# Patient Record
Sex: FEMALE
[De-identification: names, ages, dates, MRNs, and addresses within clinical notes are randomized per-mention and may not be internally consistent; named-entity substitution may affect disease eponyms.]

---

## 2020-02-03 PROBLEM — Z00.00 ENCOUNTER FOR PREVENTIVE HEALTH EXAMINATION: Status: ACTIVE | Noted: 2020-02-03

## 2020-02-05 ENCOUNTER — APPOINTMENT (OUTPATIENT)
Dept: SURGERY | Facility: CLINIC | Age: 75
End: 2020-02-05
Payer: MEDICARE

## 2020-02-05 VITALS
DIASTOLIC BLOOD PRESSURE: 84 MMHG | HEIGHT: 63 IN | SYSTOLIC BLOOD PRESSURE: 169 MMHG | HEART RATE: 68 BPM | WEIGHT: 198 LBS | BODY MASS INDEX: 35.08 KG/M2

## 2020-02-05 DIAGNOSIS — Z87.19 PERSONAL HISTORY OF OTHER DISEASES OF THE DIGESTIVE SYSTEM: ICD-10-CM

## 2020-02-05 DIAGNOSIS — Z87.39 PERSONAL HISTORY OF OTHER DISEASES OF THE MUSCULOSKELETAL SYSTEM AND CONNECTIVE TISSUE: ICD-10-CM

## 2020-02-05 DIAGNOSIS — Z86.39 PERSONAL HISTORY OF OTHER ENDOCRINE, NUTRITIONAL AND METABOLIC DISEASE: ICD-10-CM

## 2020-02-05 DIAGNOSIS — Z86.79 PERSONAL HISTORY OF OTHER DISEASES OF THE CIRCULATORY SYSTEM: ICD-10-CM

## 2020-02-05 DIAGNOSIS — Z87.891 PERSONAL HISTORY OF NICOTINE DEPENDENCE: ICD-10-CM

## 2020-02-05 PROCEDURE — 99204 OFFICE O/P NEW MOD 45 MIN: CPT

## 2020-02-05 RX ORDER — ROSUVASTATIN CALCIUM 5 MG/1
5 TABLET, FILM COATED ORAL
Refills: 0 | Status: ACTIVE | COMMUNITY

## 2020-02-05 RX ORDER — ALLOPURINOL 300 MG/1
300 TABLET ORAL
Refills: 0 | Status: ACTIVE | COMMUNITY

## 2020-02-05 RX ORDER — OLMESARTAN MEDOXOMIL 40 MG/1
40 TABLET, FILM COATED ORAL
Refills: 0 | Status: ACTIVE | COMMUNITY

## 2020-02-05 RX ORDER — ASPIRIN 81 MG
81 TABLET, DELAYED RELEASE (ENTERIC COATED) ORAL
Refills: 0 | Status: ACTIVE | COMMUNITY

## 2020-02-05 RX ORDER — VERAPAMIL HYDROCHLORIDE 240 MG/1
240 CAPSULE, DELAYED RELEASE PELLETS ORAL
Refills: 0 | Status: ACTIVE | COMMUNITY

## 2020-02-05 RX ORDER — PANTOPRAZOLE SODIUM 40 MG/1
GRANULE, DELAYED RELEASE ORAL
Refills: 0 | Status: ACTIVE | COMMUNITY

## 2020-02-05 RX ORDER — TORSEMIDE 20 MG/1
20 TABLET ORAL
Refills: 0 | Status: ACTIVE | COMMUNITY

## 2020-02-05 NOTE — ASSESSMENT
[FreeTextEntry1] : Patient with primary hyperparathyroidism with worsening osteopenia and symptoms. I recommended parathyroidectomy with intraoperative PTH monitoring. No need for thyroid resection in view of benign biopsies and stable nodules. I have requested a 4D CT scan to assistant preoperative localization.I have discussed the risks, benefits and alternative treatments which include but are not limited to bleeding, infection, numbness, hoarseness, hypocalcemia, scarring, and need for reoperation. I have answered the patient's questions. They will contact my office to schedule surgery.

## 2020-02-05 NOTE — CONSULT LETTER
[Dear  ___] : Dear  [unfilled], [Consult Letter:] : I had the pleasure of evaluating your patient, [unfilled]. [Please see my note below.] : Please see my note below. [Consult Closing:] : Thank you very much for allowing me to participate in the care of this patient.  If you have any questions, please do not hesitate to contact me. [FreeTextEntry2] : Dr. Lisette Nelson [DrShahid  ___] : Dr. MUNOZ [FreeTextEntry3] : Sincerely yours,\par \par Batsheva Davis MD, FACS\par Assistant Professor of Surgery\par [facility]

## 2020-02-05 NOTE — REASON FOR VISIT
[Initial Consultation] : an initial consultation for [Spouse] : spouse [FreeTextEntry2] : primary hyperparathyroidism

## 2020-02-05 NOTE — PHYSICAL EXAM
[de-identified] : no cervical or supraclavicular adenopathy, trachea midline, thyroid without enlargement or palpable mass [Normal] : no neck adenopathy [de-identified] : Skin:  normal appearance.  no rash, nodules, vesicles, or erythema,\par Musculoskeletal:  full range of motion and no deformities appreciated\par Neurological:  grossly intact\par Psychiatric:  oriented to person, place and time with appropriate affect

## 2020-02-05 NOTE — HISTORY OF PRESENT ILLNESS
[de-identified] : Patient referred by Dr. Nelson for evaluation of primary hyperparathyroidism. Patient reports over a 4 year history of elevated calcium.  Patient reports hypertension, reflux, fatigue and worsening osteopenia. Denies kidney stones, increased thirst, fracture, bone pain, dysphagia, change in voice or radiation exposure. TSH 1.2, calcium 10.6, , vitamin D 41. Bone density August 2019: Spine T. -0.4, hip and -2.4, wrist T. -2.0. Patient reports history of thyroid nodules, biopsied 3 years ago benign. Followup ultrasound May 2019 stable. Results not available.

## 2020-02-13 ENCOUNTER — FORM ENCOUNTER (OUTPATIENT)
Age: 75
End: 2020-02-13

## 2020-02-14 ENCOUNTER — APPOINTMENT (OUTPATIENT)
Dept: CT IMAGING | Facility: IMAGING CENTER | Age: 75
End: 2020-02-14
Payer: MEDICARE

## 2020-02-14 ENCOUNTER — OUTPATIENT (OUTPATIENT)
Dept: OUTPATIENT SERVICES | Facility: HOSPITAL | Age: 75
LOS: 1 days | End: 2020-02-14
Payer: MEDICARE

## 2020-02-14 DIAGNOSIS — E21.0 PRIMARY HYPERPARATHYROIDISM: ICD-10-CM

## 2020-02-14 PROCEDURE — 82565 ASSAY OF CREATININE: CPT

## 2020-02-14 PROCEDURE — 70492 CT SFT TSUE NCK W/O & W/DYE: CPT

## 2020-02-14 PROCEDURE — 70491 CT SOFT TISSUE NECK W/DYE: CPT | Mod: 26

## 2020-03-09 ENCOUNTER — OUTPATIENT (OUTPATIENT)
Dept: OUTPATIENT SERVICES | Facility: HOSPITAL | Age: 75
LOS: 1 days | End: 2020-03-09
Payer: MEDICARE

## 2020-03-09 VITALS
DIASTOLIC BLOOD PRESSURE: 90 MMHG | WEIGHT: 197.98 LBS | SYSTOLIC BLOOD PRESSURE: 170 MMHG | OXYGEN SATURATION: 97 % | RESPIRATION RATE: 16 BRPM | TEMPERATURE: 99 F | HEART RATE: 60 BPM | HEIGHT: 63 IN

## 2020-03-09 DIAGNOSIS — Z98.891 HISTORY OF UTERINE SCAR FROM PREVIOUS SURGERY: Chronic | ICD-10-CM

## 2020-03-09 DIAGNOSIS — E21.0 PRIMARY HYPERPARATHYROIDISM: ICD-10-CM

## 2020-03-09 DIAGNOSIS — I10 ESSENTIAL (PRIMARY) HYPERTENSION: ICD-10-CM

## 2020-03-09 DIAGNOSIS — Z90.710 ACQUIRED ABSENCE OF BOTH CERVIX AND UTERUS: Chronic | ICD-10-CM

## 2020-03-09 DIAGNOSIS — Z96.641 PRESENCE OF RIGHT ARTIFICIAL HIP JOINT: Chronic | ICD-10-CM

## 2020-03-09 LAB
ANION GAP SERPL CALC-SCNC: 17 MMO/L — HIGH (ref 7–14)
BUN SERPL-MCNC: 25 MG/DL — HIGH (ref 7–23)
CALCIUM SERPL-MCNC: 10.7 MG/DL — HIGH (ref 8.4–10.5)
CHLORIDE SERPL-SCNC: 104 MMOL/L — SIGNIFICANT CHANGE UP (ref 98–107)
CO2 SERPL-SCNC: 22 MMOL/L — SIGNIFICANT CHANGE UP (ref 22–31)
CREAT SERPL-MCNC: 0.9 MG/DL — SIGNIFICANT CHANGE UP (ref 0.5–1.3)
GLUCOSE SERPL-MCNC: 84 MG/DL — SIGNIFICANT CHANGE UP (ref 70–99)
HCT VFR BLD CALC: 46.1 % — HIGH (ref 34.5–45)
HGB BLD-MCNC: 14.3 G/DL — SIGNIFICANT CHANGE UP (ref 11.5–15.5)
MCHC RBC-ENTMCNC: 28.6 PG — SIGNIFICANT CHANGE UP (ref 27–34)
MCHC RBC-ENTMCNC: 31 % — LOW (ref 32–36)
MCV RBC AUTO: 92.2 FL — SIGNIFICANT CHANGE UP (ref 80–100)
NRBC # FLD: 0 K/UL — SIGNIFICANT CHANGE UP (ref 0–0)
PLATELET # BLD AUTO: 200 K/UL — SIGNIFICANT CHANGE UP (ref 150–400)
PMV BLD: 12.9 FL — SIGNIFICANT CHANGE UP (ref 7–13)
POTASSIUM SERPL-MCNC: 3.7 MMOL/L — SIGNIFICANT CHANGE UP (ref 3.5–5.3)
POTASSIUM SERPL-SCNC: 3.7 MMOL/L — SIGNIFICANT CHANGE UP (ref 3.5–5.3)
RBC # BLD: 5 M/UL — SIGNIFICANT CHANGE UP (ref 3.8–5.2)
RBC # FLD: 13.9 % — SIGNIFICANT CHANGE UP (ref 10.3–14.5)
SODIUM SERPL-SCNC: 143 MMOL/L — SIGNIFICANT CHANGE UP (ref 135–145)
WBC # BLD: 7.36 K/UL — SIGNIFICANT CHANGE UP (ref 3.8–10.5)
WBC # FLD AUTO: 7.36 K/UL — SIGNIFICANT CHANGE UP (ref 3.8–10.5)

## 2020-03-09 PROCEDURE — 93010 ELECTROCARDIOGRAM REPORT: CPT

## 2020-03-09 NOTE — H&P PST ADULT - VISION (WITH CORRECTIVE LENSES IF THE PATIENT USUALLY WEARS THEM):
Normal vision: sees adequately in most situations; can see medication labels, newsprint/uses glasses

## 2020-03-09 NOTE — H&P PST ADULT - HISTORY OF PRESENT ILLNESS
74 y.o. female with preop diagnosis of primary hyperparathyroidism, reports elevated calcium levels "monitored x 5 years", reports abnormal bone density test in the summer 2019, scheduled for parathyroidectomy with parathyroid hormone assay on 03/27/2020 74 y.o. female with preop diagnosis of primary hyperparathyroidism, reports hx of elevated calcium levels ~5 years monitored, reports abnormal bone density test in the summer 2019, scheduled for parathyroidectomy with parathyroid hormone assay on 03/27/2020

## 2020-03-09 NOTE — H&P PST ADULT - NSICDXPROBLEM_GEN_ALL_CORE_FT
PROBLEM DIAGNOSES  Problem: Primary hyperparathyroidism  Assessment and Plan: pt scheduled for parathyroidectomy with parathyroid hormone assay on 03/27/2020  Preop instructions provided. Pt verbalized understanding.   Pt to take protonix for GI prophylaxis   written and verbal instructions with teach back on chlorhexidine shampoo provided,  pt verbalized understanding with returned demonstration   med eval pending on 03/23/2020 as per surgeon request, copy requested    Problem: HTN (hypertension)  Assessment and Plan: pt instructed to take olmesartan and verapamil with a sip of water on the morning of the surgery PROBLEM DIAGNOSES  Problem: Primary hyperparathyroidism  Assessment and Plan: pt scheduled for parathyroidectomy with parathyroid hormone assay on 03/27/2020  Preop instructions provided. Pt verbalized understanding.   Pt to take protonix for GI prophylaxis   written and verbal instructions with teach back on chlorhexidine shampoo provided,  pt verbalized understanding with returned demonstration   med eval pending on 03/23/2020 as per surgeon request, copy requested  PAPI precautions. Pt with >3 criteria on STOP-Bang Questionaire. OR booking notified.     Problem: HTN (hypertension)  Assessment and Plan: pt instructed to take olmesartan and verapamil with a sip of water on the morning of the surgery

## 2020-03-09 NOTE — H&P PST ADULT - NSICDXFAMILYHX_GEN_ALL_CORE_FT
FAMILY HISTORY:  FH: HTN (hypertension), mother  FH: lung cancer, mother  FH: pancreatic cancer, father

## 2020-03-09 NOTE — H&P PST ADULT - NEGATIVE GENERAL GENITOURINARY SYMPTOMS
no flank pain R/no bladder infections/no hematuria/hx of renal cyst, followed by urology every 6 months, last f/u 01/2020/no flank pain L/no dysuria/no renal colic/no incontinence

## 2020-03-09 NOTE — H&P PST ADULT - NEGATIVE ENMT SYMPTOMS
no post-nasal discharge/no hearing difficulty/no dysphagia/no throat pain/no sinus symptoms/no nasal congestion

## 2020-03-09 NOTE — H&P PST ADULT - NSICDXPASTSURGICALHX_GEN_ALL_CORE_FT
PAST SURGICAL HISTORY:  History of hysterectomy     History of total hip replacement, right     S/P

## 2020-03-11 PROBLEM — I10 ESSENTIAL (PRIMARY) HYPERTENSION: Chronic | Status: ACTIVE | Noted: 2020-03-09

## 2020-03-11 PROBLEM — E78.5 HYPERLIPIDEMIA, UNSPECIFIED: Chronic | Status: ACTIVE | Noted: 2020-03-09

## 2020-03-11 PROBLEM — K21.9 GASTRO-ESOPHAGEAL REFLUX DISEASE WITHOUT ESOPHAGITIS: Chronic | Status: ACTIVE | Noted: 2020-03-09

## 2020-03-16 ENCOUNTER — APPOINTMENT (OUTPATIENT)
Dept: NUCLEAR MEDICINE | Facility: IMAGING CENTER | Age: 75
End: 2020-03-16

## 2020-06-26 ENCOUNTER — APPOINTMENT (OUTPATIENT)
Dept: SURGERY | Facility: HOSPITAL | Age: 75
End: 2020-06-26

## 2020-08-04 ENCOUNTER — OUTPATIENT (OUTPATIENT)
Dept: OUTPATIENT SERVICES | Facility: HOSPITAL | Age: 75
LOS: 1 days | End: 2020-08-04
Payer: MEDICARE

## 2020-08-04 VITALS
SYSTOLIC BLOOD PRESSURE: 170 MMHG | DIASTOLIC BLOOD PRESSURE: 86 MMHG | OXYGEN SATURATION: 99 % | RESPIRATION RATE: 16 BRPM | HEART RATE: 59 BPM | TEMPERATURE: 97 F | HEIGHT: 63 IN | WEIGHT: 197.09 LBS

## 2020-08-04 DIAGNOSIS — E21.0 PRIMARY HYPERPARATHYROIDISM: ICD-10-CM

## 2020-08-04 DIAGNOSIS — Z96.641 PRESENCE OF RIGHT ARTIFICIAL HIP JOINT: Chronic | ICD-10-CM

## 2020-08-04 DIAGNOSIS — I10 ESSENTIAL (PRIMARY) HYPERTENSION: ICD-10-CM

## 2020-08-04 DIAGNOSIS — Z87.19 PERSONAL HISTORY OF OTHER DISEASES OF THE DIGESTIVE SYSTEM: ICD-10-CM

## 2020-08-04 DIAGNOSIS — Z90.710 ACQUIRED ABSENCE OF BOTH CERVIX AND UTERUS: Chronic | ICD-10-CM

## 2020-08-04 DIAGNOSIS — Z98.891 HISTORY OF UTERINE SCAR FROM PREVIOUS SURGERY: Chronic | ICD-10-CM

## 2020-08-04 LAB
ANION GAP SERPL CALC-SCNC: 12 MMO/L — SIGNIFICANT CHANGE UP (ref 7–14)
BUN SERPL-MCNC: 24 MG/DL — HIGH (ref 7–23)
CALCIUM SERPL-MCNC: 10.7 MG/DL — HIGH (ref 8.4–10.5)
CHLORIDE SERPL-SCNC: 103 MMOL/L — SIGNIFICANT CHANGE UP (ref 98–107)
CO2 SERPL-SCNC: 27 MMOL/L — SIGNIFICANT CHANGE UP (ref 22–31)
CREAT SERPL-MCNC: 0.99 MG/DL — SIGNIFICANT CHANGE UP (ref 0.5–1.3)
GLUCOSE SERPL-MCNC: 127 MG/DL — HIGH (ref 70–99)
HCT VFR BLD CALC: 44.5 % — SIGNIFICANT CHANGE UP (ref 34.5–45)
HGB BLD-MCNC: 14.3 G/DL — SIGNIFICANT CHANGE UP (ref 11.5–15.5)
MCHC RBC-ENTMCNC: 29.2 PG — SIGNIFICANT CHANGE UP (ref 27–34)
MCHC RBC-ENTMCNC: 32.1 % — SIGNIFICANT CHANGE UP (ref 32–36)
MCV RBC AUTO: 91 FL — SIGNIFICANT CHANGE UP (ref 80–100)
NRBC # FLD: 0 K/UL — SIGNIFICANT CHANGE UP (ref 0–0)
PLATELET # BLD AUTO: 170 K/UL — SIGNIFICANT CHANGE UP (ref 150–400)
PMV BLD: 13.2 FL — HIGH (ref 7–13)
POTASSIUM SERPL-MCNC: 3.9 MMOL/L — SIGNIFICANT CHANGE UP (ref 3.5–5.3)
POTASSIUM SERPL-SCNC: 3.9 MMOL/L — SIGNIFICANT CHANGE UP (ref 3.5–5.3)
RBC # BLD: 4.89 M/UL — SIGNIFICANT CHANGE UP (ref 3.8–5.2)
RBC # FLD: 13.3 % — SIGNIFICANT CHANGE UP (ref 10.3–14.5)
SODIUM SERPL-SCNC: 142 MMOL/L — SIGNIFICANT CHANGE UP (ref 135–145)
WBC # BLD: 6.2 K/UL — SIGNIFICANT CHANGE UP (ref 3.8–10.5)
WBC # FLD AUTO: 6.2 K/UL — SIGNIFICANT CHANGE UP (ref 3.8–10.5)

## 2020-08-04 PROCEDURE — 93010 ELECTROCARDIOGRAM REPORT: CPT

## 2020-08-04 NOTE — H&P PST ADULT - NSICDXPROBLEM_GEN_ALL_CORE_FT
PROBLEM DIAGNOSES  Problem: Primary hyperparathyroidism  Assessment and Plan: parathyroidectomy with parathyroid hormone assay   Medical clearance as per DR Davis  Chlorhexidine wash with srotten and verbal instructions ,teach back appropriate   Implant ; right hip ; OR faxed     Problem: Hypertension  Assessment and Plan: pt instructed to take verapamil and olmesartan day of surgery ,monitor BP during hospital stay .     Problem: H/O gastroesophageal reflux (GERD)  Assessment and Plan: pt instructed to take protonix chava of sugery . Pt is npo from 11 pm the night before sugery .

## 2020-08-04 NOTE — H&P PST ADULT - NSICDXPASTMEDICALHX_GEN_ALL_CORE_FT
PAST MEDICAL HISTORY:  Acid reflux     Gout last 2010    HLD (hyperlipidemia)     HTN (hypertension)

## 2020-08-04 NOTE — H&P PST ADULT - HISTORY OF PRESENT ILLNESS
This is a 75 y.o. female with elevated calcium levels ,sono done,  follow -up Ct of head and chest . Pt has primary hyperparathyroidism . PT now for surgery.

## 2020-08-04 NOTE — H&P PST ADULT - RS GEN PE MLT RESP DETAILS PC
breath sounds equal/no wheezes/clear to auscultation bilaterally/no rales/good air movement/respirations non-labored/no rhonchi

## 2020-08-05 PROBLEM — M10.9 GOUT, UNSPECIFIED: Chronic | Status: ACTIVE | Noted: 2020-03-09

## 2020-08-06 DIAGNOSIS — Z01.818 ENCOUNTER FOR OTHER PREPROCEDURAL EXAMINATION: ICD-10-CM

## 2020-08-09 ENCOUNTER — APPOINTMENT (OUTPATIENT)
Dept: DISASTER EMERGENCY | Facility: CLINIC | Age: 75
End: 2020-08-09

## 2020-08-10 LAB — SARS-COV-2 N GENE NPH QL NAA+PROBE: NOT DETECTED

## 2020-08-11 ENCOUNTER — APPOINTMENT (OUTPATIENT)
Dept: SURGERY | Facility: HOSPITAL | Age: 75
End: 2020-08-11

## 2020-08-11 ENCOUNTER — TRANSCRIPTION ENCOUNTER (OUTPATIENT)
Age: 75
End: 2020-08-11

## 2020-08-11 NOTE — ASU PATIENT PROFILE, ADULT - NSTOBACCO TYPE_GEN_A_CORE_RD
ED Medical Screen (RME)





- General


Chief Complaint: Fever


Stated Complaint: FEVER


Time Seen by Provider: 03/06/20 20:08


Notes: 





HPI: History is obtained from the mother.  A 8-year-old female brought for 

evaluation of fever that began yesterday.  Mother states patient has a history 

of urinary tract infections that only present with fever.  Has not had vomiting 

or lethargy.  Has not had sore throat complaint.  Denies abdominal pain.  

Patient denies discomfort with urination.





I have greeted and performed a rapid initial assessment of this patient.  A 

comprehensive ED assessment and evaluation of the patient, analysis of test 

results and completion of the medical decision making process will be conducted 

by additional ED providers








PHYSICAL EXAMINATION:





GENERAL: Well-appearing, well-nourished and in no acute distress.


HEAD: Atraumatic, normocephalic.


EYES:  sclera anicteric, conjunctiva are normal.


ENT: Moist mucous membranes.  No pharyngeal erythema.


NECK: Normal range of motion


LUNGS: Normal work of breathing clear to auscultation


HEART: 2+ radial pulses bilaterally, regular rate and rhythm


ABD: limited by positioning for exam in triage.  No abdominal pain on palpation.


EXTREMITIES: no pitting or edema.  No cyanosis.


NEUROLOGICAL: No focal neurological deficits. Moves all extremities 

spontaneously and on command.


PSYCH: Normal mood, normal affect.


SKIN: Warm, Dry, normal turgor, no rashes or lesions noted.








TRAVEL OUTSIDE OF THE [place] IN LAST 30 DAYS: No





- Related Data


Allergies/Adverse Reactions: 


                                        





No Known Allergies Allergy (Verified 08/17/13 08:23)


   











Physical Exam





- Vital signs


Vitals: 





                                        











Temp Pulse Resp BP Pulse Ox


 


 100.8 F H  117 H  20   103/60   96 


 


 03/06/20 19:42  03/06/20 19:42  03/06/20 19:42  03/06/20 19:42  03/06/20 19:42














Course





- Vital Signs


Vital signs: 





                                        











Temp Pulse Resp BP Pulse Ox


 


 100.8 F H  117 H  20   103/60   96 


 


 03/06/20 19:42  03/06/20 19:42  03/06/20 19:42  03/06/20 19:42  03/06/20 19:42 Cigarettes

## 2020-08-11 NOTE — ASU PATIENT PROFILE, ADULT - VISION (WITH CORRECTIVE LENSES IF THE PATIENT USUALLY WEARS THEM):
progressive eye glass/Normal vision: sees adequately in most situations; can see medication labels, newsprint

## 2020-08-12 ENCOUNTER — OUTPATIENT (OUTPATIENT)
Dept: OUTPATIENT SERVICES | Facility: HOSPITAL | Age: 75
LOS: 1 days | Discharge: HOME | End: 2020-08-12
Payer: MEDICARE

## 2020-08-12 ENCOUNTER — APPOINTMENT (OUTPATIENT)
Dept: SURGERY | Facility: HOSPITAL | Age: 75
End: 2020-08-12

## 2020-08-12 ENCOUNTER — RESULT REVIEW (OUTPATIENT)
Age: 75
End: 2020-08-12

## 2020-08-12 VITALS
DIASTOLIC BLOOD PRESSURE: 79 MMHG | HEART RATE: 73 BPM | OXYGEN SATURATION: 97 % | SYSTOLIC BLOOD PRESSURE: 169 MMHG | RESPIRATION RATE: 17 BRPM

## 2020-08-12 VITALS
HEIGHT: 63 IN | OXYGEN SATURATION: 98 % | DIASTOLIC BLOOD PRESSURE: 79 MMHG | HEART RATE: 69 BPM | RESPIRATION RATE: 16 BRPM | TEMPERATURE: 99 F | SYSTOLIC BLOOD PRESSURE: 194 MMHG | WEIGHT: 197.09 LBS

## 2020-08-12 DIAGNOSIS — Z96.641 PRESENCE OF RIGHT ARTIFICIAL HIP JOINT: Chronic | ICD-10-CM

## 2020-08-12 DIAGNOSIS — Z98.891 HISTORY OF UTERINE SCAR FROM PREVIOUS SURGERY: Chronic | ICD-10-CM

## 2020-08-12 DIAGNOSIS — Z90.710 ACQUIRED ABSENCE OF BOTH CERVIX AND UTERUS: Chronic | ICD-10-CM

## 2020-08-12 DIAGNOSIS — E21.0 PRIMARY HYPERPARATHYROIDISM: ICD-10-CM

## 2020-08-12 PROCEDURE — 60500 EXPLORE PARATHYROID GLANDS: CPT

## 2020-08-12 PROCEDURE — 88305 TISSUE EXAM BY PATHOLOGIST: CPT | Mod: 26

## 2020-08-12 PROCEDURE — 88331 PATH CONSLTJ SURG 1 BLK 1SPC: CPT | Mod: 26

## 2020-08-12 RX ORDER — ALLOPURINOL 300 MG
1 TABLET ORAL
Qty: 0 | Refills: 0 | DISCHARGE

## 2020-08-12 RX ORDER — VERAPAMIL HCL 240 MG
1 CAPSULE, EXTENDED RELEASE PELLETS 24 HR ORAL
Qty: 0 | Refills: 0 | DISCHARGE

## 2020-08-12 RX ORDER — BENZOCAINE AND MENTHOL 5; 1 G/100ML; G/100ML
1 LIQUID ORAL
Qty: 0 | Refills: 0 | DISCHARGE
Start: 2020-08-12

## 2020-08-12 RX ORDER — ACETAMINOPHEN 500 MG
2 TABLET ORAL
Qty: 0 | Refills: 0 | DISCHARGE
Start: 2020-08-12

## 2020-08-12 RX ORDER — OLMESARTAN MEDOXOMIL 5 MG/1
1 TABLET, FILM COATED ORAL
Qty: 0 | Refills: 0 | DISCHARGE

## 2020-08-12 RX ORDER — SODIUM CHLORIDE 9 MG/ML
1000 INJECTION, SOLUTION INTRAVENOUS
Refills: 0 | Status: DISCONTINUED | OUTPATIENT
Start: 2020-08-12 | End: 2020-08-27

## 2020-08-12 RX ORDER — ACETAMINOPHEN 500 MG
1000 TABLET ORAL ONCE
Refills: 0 | Status: COMPLETED | OUTPATIENT
Start: 2020-08-12 | End: 2020-08-12

## 2020-08-12 RX ORDER — ACETAMINOPHEN 500 MG
650 TABLET ORAL EVERY 6 HOURS
Refills: 0 | Status: DISCONTINUED | OUTPATIENT
Start: 2020-08-12 | End: 2020-08-27

## 2020-08-12 RX ORDER — BENZOCAINE AND MENTHOL 5; 1 G/100ML; G/100ML
1 LIQUID ORAL ONCE
Refills: 0 | Status: COMPLETED | OUTPATIENT
Start: 2020-08-12 | End: 2020-08-12

## 2020-08-12 RX ORDER — CHOLECALCIFEROL (VITAMIN D3) 125 MCG
1 CAPSULE ORAL
Qty: 0 | Refills: 0 | DISCHARGE

## 2020-08-12 RX ORDER — BENZOCAINE AND MENTHOL 5; 1 G/100ML; G/100ML
1 LIQUID ORAL
Refills: 0 | Status: DISCONTINUED | OUTPATIENT
Start: 2020-08-12 | End: 2020-08-27

## 2020-08-12 RX ORDER — ASPIRIN/CALCIUM CARB/MAGNESIUM 324 MG
81 TABLET ORAL
Qty: 0 | Refills: 0 | DISCHARGE

## 2020-08-12 RX ORDER — PANTOPRAZOLE SODIUM 20 MG/1
1 TABLET, DELAYED RELEASE ORAL
Qty: 0 | Refills: 0 | DISCHARGE

## 2020-08-12 RX ORDER — ROSUVASTATIN CALCIUM 5 MG/1
1 TABLET ORAL
Qty: 0 | Refills: 0 | DISCHARGE

## 2020-08-12 RX ADMIN — Medication 2 TABLET(S): at 18:36

## 2020-08-12 RX ADMIN — BENZOCAINE AND MENTHOL 1 LOZENGE: 5; 1 LIQUID ORAL at 18:00

## 2020-08-12 RX ADMIN — Medication 400 MILLIGRAM(S): at 18:00

## 2020-08-12 NOTE — ASU DISCHARGE PLAN (ADULT/PEDIATRIC) - NURSING INSTRUCTIONS
DO NOT take any Tylenol (Acetaminophen) or narcotics containing Tylenol until after  12am . You received Tylenol during your operation and it can cause damage to your liver if too much is taken within a 24 hour time period.

## 2020-08-12 NOTE — ASU DISCHARGE PLAN (ADULT/PEDIATRIC) - CALL YOUR DOCTOR IF YOU HAVE ANY OF THE FOLLOWING:
Nausea and vomiting that does not stop/Bleeding that does not stop/Fever greater than (need to indicate Fahrenheit or Celsius)/Numbness, tingling, color or temperature change to extremity/Inability to tolerate liquids or foods

## 2020-08-12 NOTE — BRIEF OPERATIVE NOTE - OPERATION/FINDINGS
Abnormal left superior parathyroid, noted to be hypercellular on frozen section. PTH did not respond after extraction (baseline 120, 156 after removal). Left inferior parathyroid noted to be normal in appearance. Right inferior parathyroid noted to be abnormal and PTH responded well after removal (87, 55 at 5, 10 min). Hemostasis achieved with surgicel but oozing from thyroid so perforated kaci drain left in place.

## 2020-08-12 NOTE — BRIEF OPERATIVE NOTE - SPECIMENS
left superior thyroid nodule (r/o parathyroid), Left superior parathyroid, right midthyroid nodule, right inferior parathyroid

## 2020-08-13 ENCOUNTER — APPOINTMENT (OUTPATIENT)
Dept: SURGERY | Facility: CLINIC | Age: 75
End: 2020-08-13
Payer: MEDICARE

## 2020-08-13 PROCEDURE — 99024 POSTOP FOLLOW-UP VISIT: CPT

## 2020-08-13 RX ORDER — AMOXICILLIN 500 MG/1
500 CAPSULE ORAL
Qty: 30 | Refills: 0 | Status: DISCONTINUED | COMMUNITY
Start: 2020-05-19

## 2020-08-13 RX ORDER — PANTOPRAZOLE 20 MG/1
20 TABLET, DELAYED RELEASE ORAL
Qty: 90 | Refills: 0 | Status: DISCONTINUED | COMMUNITY
Start: 2020-08-09

## 2020-08-13 NOTE — HISTORY OF PRESENT ILLNESS
[de-identified] : Patient referred by Dr. Nelson for evaluation of primary hyperparathyroidism. Patient reports over a 4 year history of elevated calcium.  Patient reports hypertension, reflux, fatigue and worsening osteopenia. Denies kidney stones, increased thirst, fracture, bone pain, dysphagia, change in voice or radiation exposure. TSH 1.2, calcium 10.6, , vitamin D 41. Bone density August 2019: Spine T. -0.4, hip and -2.4, wrist T. -2.0. Patient reports history of thyroid nodules, biopsied 3 years ago benign. Followup ultrasound May 2019 stable. Results not available.\par 8/12/20202 left suoeriro and right inferior parathyroidectomy. denies dysphagia, hoarseness or parathesias. SEJAL serous

## 2020-08-13 NOTE — ASSESSMENT
[FreeTextEntry1] : Patient with primary hyperparathyroidism with worsening osteopenia and symptoms.doing well postop RTo 1  week

## 2020-08-13 NOTE — PHYSICAL EXAM
[de-identified] : no cervical or supraclavicular adenopathy, trachea midline, thyroid without enlargement or palpable mass. dressing intact, SEJAL removed [de-identified] : Skin:  normal appearance.  no rash, nodules, vesicles, or erythema,\par Musculoskeletal:  full range of motion and no deformities appreciated\par Neurological:  grossly intact\par Psychiatric:  oriented to person, place and time with appropriate affect [Normal] : no neck adenopathy

## 2020-08-15 NOTE — HISTORY OF PRESENT ILLNESS
[de-identified] : Patient referred by Dr. Nelson for evaluation of primary hyperparathyroidism. Patient reports over a 4 year history of elevated calcium.  Patient reports hypertension, reflux, fatigue and worsening osteopenia. Denies kidney stones, increased thirst, fracture, bone pain, dysphagia, change in voice or radiation exposure. TSH 1.2, calcium 10.6, , vitamin D 41. Bone density August 2019: Spine T. -0.4, hip and -2.4, wrist T. -2.0. Patient reports history of thyroid nodules, biopsied 3 years ago benign. Followup ultrasound May 2019 stable. Results not available.\par 8/12/20202 left suoeriro and right inferior parathyroidectomy. denies dysphagia, hoarseness or parathesias. SEJAL serous

## 2020-08-15 NOTE — PHYSICAL EXAM
[de-identified] : no cervical or supraclavicular adenopathy, trachea midline, thyroid without enlargement or palpable mass. dressing intact, SEJAL removed [Normal] : no neck adenopathy [de-identified] : Skin:  normal appearance.  no rash, nodules, vesicles, or erythema,\par Musculoskeletal:  full range of motion and no deformities appreciated\par Neurological:  grossly intact\par Psychiatric:  oriented to person, place and time with appropriate affect

## 2020-08-17 LAB — SURGICAL PATHOLOGY STUDY: SIGNIFICANT CHANGE UP

## 2020-08-25 ENCOUNTER — APPOINTMENT (OUTPATIENT)
Dept: SURGERY | Facility: CLINIC | Age: 75
End: 2020-08-25
Payer: MEDICARE

## 2020-08-25 PROCEDURE — 36415 COLL VENOUS BLD VENIPUNCTURE: CPT

## 2020-08-25 PROCEDURE — 99024 POSTOP FOLLOW-UP VISIT: CPT

## 2020-08-25 NOTE — HISTORY OF PRESENT ILLNESS
[de-identified] : Patient referred by Dr. Nelson for evaluation of primary hyperparathyroidism. Patient reports over a 4 year history of elevated calcium.  Patient reports hypertension, reflux, fatigue and worsening osteopenia. Denies kidney stones, increased thirst, fracture, bone pain, dysphagia, change in voice or radiation exposure. TSH 1.2, calcium 10.6, , vitamin D 41. Bone density August 2019: Spine T. -0.4, hip and -2.4, wrist T. -2.0. Patient reports history of thyroid nodules, biopsied 3 years ago benign. Followup ultrasound May 2019 stable. Results not available.\par 8/12/20202 left superior and right inferior parathyroidectomy. denies dysphagia, hoarseness or parathesias. pathology hypercellular parathyroid.

## 2020-08-25 NOTE — ASSESSMENT
[FreeTextEntry1] : Patient with primary hyperparathyroidism with worsening osteopenia and symptoms.doing well postop , ingrid sent RTo 6 week

## 2020-08-25 NOTE — PHYSICAL EXAM
[de-identified] : no cervical or supraclavicular adenopathy, trachea midline, thyroid without enlargement or palpable mass. inciision healing with mild swelling, scar min discussed.  [de-identified] : Skin:  normal appearance.  no rash, nodules, vesicles, or erythema,\par Musculoskeletal:  full range of motion and no deformities appreciated\par Neurological:  grossly intact\par Psychiatric:  oriented to person, place and time with appropriate affect [Normal] : no neck adenopathy

## 2020-08-28 LAB
25(OH)D3 SERPL-MCNC: 40.3 NG/ML
CALCIUM SERPL-MCNC: 9.4 MG/DL
CALCIUM SERPL-MCNC: 9.4 MG/DL
PARATHYROID HORMONE INTACT: 66 PG/ML

## 2020-09-29 ENCOUNTER — APPOINTMENT (OUTPATIENT)
Dept: SURGERY | Facility: CLINIC | Age: 75
End: 2020-09-29
Payer: MEDICARE

## 2020-09-29 PROCEDURE — 36415 COLL VENOUS BLD VENIPUNCTURE: CPT

## 2020-09-29 PROCEDURE — 99024 POSTOP FOLLOW-UP VISIT: CPT

## 2020-09-29 NOTE — PHYSICAL EXAM
[de-identified] : no cervical or supraclavicular adenopathy, trachea midline, thyroid without enlargement or palpable mass. inciision healing with mild tightness, scar min discussed.  [Normal] : no neck adenopathy [de-identified] : Skin:  normal appearance.  no rash, nodules, vesicles, or erythema,\par Musculoskeletal:  full range of motion and no deformities appreciated\par Neurological:  grossly intact\par Psychiatric:  oriented to person, place and time with appropriate affect

## 2020-09-29 NOTE — ASSESSMENT
[FreeTextEntry1] : Patient with primary hyperparathyroidism with worsening osteopenia and symptoms.doing well postop , ingrid sent RTo 4 mo  thyroid US 1/2021

## 2020-09-29 NOTE — HISTORY OF PRESENT ILLNESS
[de-identified] : Patient referred by Dr. Nelson for evaluation of primary hyperparathyroidism. Patient reports over a 4 year history of elevated calcium.  Patient reports hypertension, reflux, fatigue and worsening osteopenia. Denies kidney stones, increased thirst, fracture, bone pain, dysphagia, change in voice or radiation exposure. TSH 1.2, calcium 10.6, , vitamin D 41. Bone density August 2019: Spine T. -0.4, hip and -2.4, wrist T. -2.0. Patient reports history of thyroid nodules, biopsied 3 years ago benign. Followup ultrasound May 2019 stable. Results not available.\par 8/12/20202 left superior and right inferior parathyroidectomy. denies dysphagia, hoarseness or parathesias. pathology hypercellular parathyroid. currently on calcium 1200 and vit D 600 daily

## 2020-10-04 LAB
25(OH)D3 SERPL-MCNC: 43.2 NG/ML
CALCIUM SERPL-MCNC: 9.8 MG/DL
CALCIUM SERPL-MCNC: 9.8 MG/DL
PARATHYROID HORMONE INTACT: 73 PG/ML
T3 SERPL-MCNC: 103 NG/DL
T4 FREE SERPL-MCNC: 1.1 NG/DL
TSH SERPL-ACNC: 2.42 UIU/ML

## 2021-01-12 ENCOUNTER — OUTPATIENT (OUTPATIENT)
Dept: OUTPATIENT SERVICES | Facility: HOSPITAL | Age: 76
LOS: 1 days | End: 2021-01-12
Payer: MEDICARE

## 2021-01-12 ENCOUNTER — APPOINTMENT (OUTPATIENT)
Dept: ULTRASOUND IMAGING | Facility: CLINIC | Age: 76
End: 2021-01-12
Payer: MEDICARE

## 2021-01-12 ENCOUNTER — RESULT REVIEW (OUTPATIENT)
Age: 76
End: 2021-01-12

## 2021-01-12 DIAGNOSIS — Z96.641 PRESENCE OF RIGHT ARTIFICIAL HIP JOINT: Chronic | ICD-10-CM

## 2021-01-12 DIAGNOSIS — Z98.891 HISTORY OF UTERINE SCAR FROM PREVIOUS SURGERY: Chronic | ICD-10-CM

## 2021-01-12 DIAGNOSIS — Z90.710 ACQUIRED ABSENCE OF BOTH CERVIX AND UTERUS: Chronic | ICD-10-CM

## 2021-01-12 DIAGNOSIS — E04.2 NONTOXIC MULTINODULAR GOITER: ICD-10-CM

## 2021-01-12 PROCEDURE — 76536 US EXAM OF HEAD AND NECK: CPT

## 2021-01-12 PROCEDURE — 76536 US EXAM OF HEAD AND NECK: CPT | Mod: 26

## 2021-01-26 ENCOUNTER — LABORATORY RESULT (OUTPATIENT)
Age: 76
End: 2021-01-26

## 2021-01-26 ENCOUNTER — APPOINTMENT (OUTPATIENT)
Dept: SURGERY | Facility: CLINIC | Age: 76
End: 2021-01-26
Payer: MEDICARE

## 2021-01-26 PROCEDURE — 99213 OFFICE O/P EST LOW 20 MIN: CPT

## 2021-01-26 PROCEDURE — 36415 COLL VENOUS BLD VENIPUNCTURE: CPT

## 2021-01-26 PROCEDURE — 99072 ADDL SUPL MATRL&STAF TM PHE: CPT

## 2021-01-26 NOTE — HISTORY OF PRESENT ILLNESS
[de-identified] : Patient referred by Dr. Nelson for evaluation of primary hyperparathyroidism. Patient reports over a 4 year history of elevated calcium.  Patient reports hypertension, reflux, fatigue and worsening osteopenia. Denies kidney stones, increased thirst, fracture, bone pain, dysphagia, change in voice or radiation exposure. TSH 1.2, calcium 10.6, , vitamin D 41. Bone density August 2019: Spine T. -0.4, hip and -2.4, wrist T. -2.0. Patient reports history of thyroid nodules, biopsied 3 years ago benign. Followup ultrasound May 2019 stable. Results not available.\par 8/12/20202 left superior and right inferior parathyroidectomy. denies dysphagia, hoarseness or parathesias. pathology hypercellular parathyroid. currently on calcium 1200 and vit D 600 daily.  thyroid US 1/16/21 with MNG, no comparison with largest 1.5 cm.  continues on calcium and vit D.  denies recent illness

## 2021-01-26 NOTE — PHYSICAL EXAM
[de-identified] : no cervical or supraclavicular adenopathy, trachea midline, thyroid without enlargement or palpable mass. inciision healing with minimal tightness, scar min discussed.  [Normal] : no neck adenopathy [de-identified] : Skin:  normal appearance.  no rash, nodules, vesicles, or erythema,\par Musculoskeletal:  full range of motion and no deformities appreciated\par Neurological:  grossly intact\par Psychiatric:  oriented to person, place and time with appropriate affect

## 2021-01-26 NOTE — ASSESSMENT
[FreeTextEntry1] : Patient with primary hyperparathyroidism with worsening osteopenia and symptoms.doing well MNG will monitor, prior FNA banign, no prior studies for comparison,  repeat thyroid US 6/2021  , ingrid sent RTo 6 mo

## 2021-01-28 ENCOUNTER — NON-APPOINTMENT (OUTPATIENT)
Age: 76
End: 2021-01-28

## 2021-01-28 LAB
25(OH)D3 SERPL-MCNC: 47.5 NG/ML
CALCIUM SERPL-MCNC: 9.4 MG/DL
CALCIUM SERPL-MCNC: 9.4 MG/DL
PARATHYROID HORMONE INTACT: 56 PG/ML
T3 SERPL-MCNC: 90 NG/DL
T4 FREE SERPL-MCNC: 1.3 NG/DL
THYROGLOB AB SERPL-ACNC: <20 IU/ML
THYROGLOB SERPL-MCNC: 89.9 NG/ML
TSH SERPL-ACNC: 2.1 UIU/ML

## 2021-06-29 ENCOUNTER — OUTPATIENT (OUTPATIENT)
Dept: OUTPATIENT SERVICES | Facility: HOSPITAL | Age: 76
LOS: 1 days | End: 2021-06-29
Payer: MEDICARE

## 2021-06-29 ENCOUNTER — APPOINTMENT (OUTPATIENT)
Dept: ULTRASOUND IMAGING | Facility: CLINIC | Age: 76
End: 2021-06-29
Payer: MEDICARE

## 2021-06-29 DIAGNOSIS — Z90.710 ACQUIRED ABSENCE OF BOTH CERVIX AND UTERUS: Chronic | ICD-10-CM

## 2021-06-29 DIAGNOSIS — E04.2 NONTOXIC MULTINODULAR GOITER: ICD-10-CM

## 2021-06-29 DIAGNOSIS — E21.0 PRIMARY HYPERPARATHYROIDISM: ICD-10-CM

## 2021-06-29 DIAGNOSIS — Z98.891 HISTORY OF UTERINE SCAR FROM PREVIOUS SURGERY: Chronic | ICD-10-CM

## 2021-06-29 DIAGNOSIS — Z96.641 PRESENCE OF RIGHT ARTIFICIAL HIP JOINT: Chronic | ICD-10-CM

## 2021-06-29 PROCEDURE — 76536 US EXAM OF HEAD AND NECK: CPT

## 2021-06-29 PROCEDURE — 76536 US EXAM OF HEAD AND NECK: CPT | Mod: 26

## 2021-07-20 ENCOUNTER — APPOINTMENT (OUTPATIENT)
Dept: SURGERY | Facility: CLINIC | Age: 76
End: 2021-07-20
Payer: MEDICARE

## 2021-07-20 PROCEDURE — 36415 COLL VENOUS BLD VENIPUNCTURE: CPT

## 2021-07-20 PROCEDURE — 99213 OFFICE O/P EST LOW 20 MIN: CPT

## 2021-07-20 NOTE — PHYSICAL EXAM
[de-identified] : no cervical or supraclavicular adenopathy, trachea midline, thyroid without enlargement or palpable mass. inciision healing with minimal tightness, scar min discussed.  [Normal] : no neck adenopathy [de-identified] : Skin:  normal appearance.  no rash, nodules, vesicles, or erythema,\par Musculoskeletal:  full range of motion and no deformities appreciated\par Neurological:  grossly intact\par Psychiatric:  oriented to person, place and time with appropriate affect

## 2021-07-20 NOTE — ASSESSMENT
[FreeTextEntry1] : Patient with primary hyperparathyroidism with worsening osteopenia and symptoms. no evidenc eof recurrence.  doing well MNG will monitor, prior FNA benign,  repeat thyroid US 6/2022  , ingrid sent RTo  1 year

## 2021-07-20 NOTE — HISTORY OF PRESENT ILLNESS
[de-identified] : Patient referred by Dr. Nelson for evaluation of primary hyperparathyroidism. Patient reports over a 4 year history of elevated calcium.  Patient reports hypertension, reflux, fatigue and worsening osteopenia. Denies kidney stones, increased thirst, fracture, bone pain, dysphagia, change in voice or radiation exposure. TSH 1.2, calcium 10.6, , vitamin D 41. Bone density August 2019: Spine T. -0.4, hip and -2.4, wrist T. -2.0. Patient reports history of thyroid nodules, biopsied 3 years ago benign. Followup ultrasound May 2019 stable. Results not available.\par 8/12/20202 left superior and right inferior parathyroidectomy. denies dysphagia, hoarseness or parathesias. pathology hypercellular parathyroid. currently on calcium 1200 and vit D 600 daily.  thyroid US 1/16/21 with [facility], no comparison with largest 1.5 cm. f/u US 6/2021 stable nodules.  biopsy 2014 reports reviewed.   continues on calcium and vit D.  denies recent illness.  I have reviewed all old and new data and available images.

## 2021-07-22 ENCOUNTER — NON-APPOINTMENT (OUTPATIENT)
Age: 76
End: 2021-07-22

## 2021-07-22 LAB
25(OH)D3 SERPL-MCNC: 55.2 NG/ML
CALCIUM SERPL-MCNC: 10.1 MG/DL
CALCIUM SERPL-MCNC: 10.1 MG/DL
PARATHYROID HORMONE INTACT: 79 PG/ML
T3 SERPL-MCNC: 105 NG/DL
T4 FREE SERPL-MCNC: 1.2 NG/DL
TSH SERPL-ACNC: 1.76 UIU/ML

## 2022-06-23 ENCOUNTER — OUTPATIENT (OUTPATIENT)
Dept: OUTPATIENT SERVICES | Facility: HOSPITAL | Age: 77
LOS: 1 days | End: 2022-06-23
Payer: MEDICARE

## 2022-06-23 ENCOUNTER — APPOINTMENT (OUTPATIENT)
Dept: ULTRASOUND IMAGING | Facility: CLINIC | Age: 77
End: 2022-06-23

## 2022-06-23 DIAGNOSIS — Z98.891 HISTORY OF UTERINE SCAR FROM PREVIOUS SURGERY: Chronic | ICD-10-CM

## 2022-06-23 DIAGNOSIS — E04.2 NONTOXIC MULTINODULAR GOITER: ICD-10-CM

## 2022-06-23 DIAGNOSIS — Z96.641 PRESENCE OF RIGHT ARTIFICIAL HIP JOINT: Chronic | ICD-10-CM

## 2022-06-23 DIAGNOSIS — Z90.710 ACQUIRED ABSENCE OF BOTH CERVIX AND UTERUS: Chronic | ICD-10-CM

## 2022-06-23 DIAGNOSIS — M85.839 OTHER SPECIFIED DISORDERS OF BONE DENSITY AND STRUCTURE, UNSPECIFIED FOREARM: ICD-10-CM

## 2022-06-23 DIAGNOSIS — M85.852 OTHER SPECIFIED DISORDERS OF BONE DENSITY AND STRUCTURE, LEFT THIGH: ICD-10-CM

## 2022-06-23 DIAGNOSIS — E21.0 PRIMARY HYPERPARATHYROIDISM: ICD-10-CM

## 2022-06-23 PROCEDURE — 76536 US EXAM OF HEAD AND NECK: CPT | Mod: 26

## 2022-06-23 PROCEDURE — 76536 US EXAM OF HEAD AND NECK: CPT

## 2022-07-26 ENCOUNTER — APPOINTMENT (OUTPATIENT)
Dept: SURGERY | Facility: CLINIC | Age: 77
End: 2022-07-26

## 2022-07-26 PROCEDURE — 99213 OFFICE O/P EST LOW 20 MIN: CPT

## 2022-07-26 PROCEDURE — 36415 COLL VENOUS BLD VENIPUNCTURE: CPT

## 2022-07-26 NOTE — PHYSICAL EXAM
[de-identified] : no cervical or supraclavicular adenopathy, trachea midline, thyroid without enlargement or palpable mass. inciision healing with minimal tightness, scar min discussed.  [Normal] : no neck adenopathy [de-identified] : Skin:  normal appearance.  no rash, nodules, vesicles, or erythema,\par Musculoskeletal:  full range of motion and no deformities appreciated\par Neurological:  grossly intact\par Psychiatric:  oriented to person, place and time with appropriate affect

## 2022-07-26 NOTE — ASSESSMENT
[FreeTextEntry1] : Patient with primary hyperparathyroidism with worsening osteopenia and symptoms. no evidence of recurrence.  doing well MNG stable on US/  will monitor, prior FNA benign,  repeat thyroid US 6/2023, bone density now  , ingrid sent  RTo  1 year  I have answered their questions to the best of my ability.\par

## 2022-07-28 ENCOUNTER — NON-APPOINTMENT (OUTPATIENT)
Age: 77
End: 2022-07-28

## 2022-07-28 LAB
25(OH)D3 SERPL-MCNC: 53.7 NG/ML
CALCIUM SERPL-MCNC: 9.6 MG/DL
CALCIUM SERPL-MCNC: 9.6 MG/DL
PARATHYROID HORMONE INTACT: 43 PG/ML
T3 SERPL-MCNC: 104 NG/DL
T4 FREE SERPL-MCNC: 1.5 NG/DL
THYROGLOB AB SERPL-ACNC: <20 IU/ML
THYROPEROXIDASE AB SERPL IA-ACNC: 29.9 IU/ML
TSH SERPL-ACNC: 2 UIU/ML

## 2023-07-31 ENCOUNTER — APPOINTMENT (OUTPATIENT)
Dept: ULTRASOUND IMAGING | Facility: CLINIC | Age: 78
End: 2023-07-31
Payer: MEDICARE

## 2023-07-31 PROCEDURE — 76536 US EXAM OF HEAD AND NECK: CPT

## 2023-08-08 ENCOUNTER — APPOINTMENT (OUTPATIENT)
Dept: SURGERY | Facility: CLINIC | Age: 78
End: 2023-08-08
Payer: MEDICARE

## 2023-08-08 VITALS
WEIGHT: 207 LBS | BODY MASS INDEX: 36.68 KG/M2 | DIASTOLIC BLOOD PRESSURE: 80 MMHG | SYSTOLIC BLOOD PRESSURE: 130 MMHG | OXYGEN SATURATION: 96 % | HEIGHT: 63 IN | TEMPERATURE: 97 F | HEART RATE: 55 BPM

## 2023-08-08 DIAGNOSIS — E21.0 PRIMARY HYPERPARATHYROIDISM: ICD-10-CM

## 2023-08-08 DIAGNOSIS — M85.839 OTHER SPECIFIED DISORDERS OF BONE DENSITY AND STRUCTURE, UNSPECIFIED FOREARM: ICD-10-CM

## 2023-08-08 DIAGNOSIS — E04.2 NONTOXIC MULTINODULAR GOITER: ICD-10-CM

## 2023-08-08 DIAGNOSIS — M85.852 OTHER SPECIFIED DISORDERS OF BONE DENSITY AND STRUCTURE, LEFT THIGH: ICD-10-CM

## 2023-08-08 PROCEDURE — 99213 OFFICE O/P EST LOW 20 MIN: CPT

## 2023-08-08 NOTE — PHYSICAL EXAM
[de-identified] : no cervical or supraclavicular adenopathy, trachea midline, thyroid without enlargement or palpable mass. inciision healing with minimal tightness, scar min discussed.  [Normal] : no neck adenopathy [de-identified] : Skin:  normal appearance.  no rash, nodules, vesicles, or erythema,\par  Musculoskeletal:  full range of motion and no deformities appreciated\par  Neurological:  grossly intact\par  Psychiatric:  oriented to person, place and time with appropriate affect

## 2023-08-08 NOTE — HISTORY OF PRESENT ILLNESS
[de-identified] : Patient referred by Dr. Nelson for evaluation of primary hyperparathyroidism. Patient reports over a 4 year history of elevated calcium.  Patient reports hypertension, reflux, fatigue and worsening osteopenia. Denies kidney stones, increased thirst, fracture, bone pain, dysphagia, change in voice or radiation exposure. TSH 1.2, calcium 10.6, , vitamin D 41. Bone density August 2019: Spine T. -0.4, hip and -2.4, wrist T. -2.0. Patient reports history of thyroid nodules, biopsied 3 years ago benign. Followup ultrasound May 2019 stable. Results not available. 8/12/20202 left superior and right inferior parathyroidectomy. denies dysphagia, hoarseness or parathesias. pathology hypercellular parathyroid.  thyroid US 1/16/21 with MNG, no comparison with largest 1.5 cm. f/u US 6/2021, 6/2022 and 7/2023 stable nodules.  biopsy 2014 reports reviewed.   continues on 2 calcium and vit D daily .  denies recent illness.  I have reviewed all old and new data and available images.

## 2023-08-08 NOTE — ASSESSMENT
[FreeTextEntry1] : Patient with hx/of primary hyperparathyroidism with worsening osteopenia and symptoms. no evidence of recurrence.  doing well.   MNG stable on US with prior FNA benign,  stable on repeat thyroid US for over 4 years.  Patient will continue with yearly f/u and contact office if change noted.   I have answered their questions to the best of my ability.

## 2023-08-28 NOTE — H&P PST ADULT - IS PATIENT PREGNANT?
[FreeTextEntry1] : Microscopic hematuria. US shows R hydro and L ureterocele. Needs full hematuria eval which will eval uppertract with CTU for hydro eval.  --UA, UCx, cyto --CTU --Cysto
no

## 2024-04-29 NOTE — ASU PATIENT PROFILE, ADULT - SURGICAL SITE INCISION
[FreeTextEntry1] : Skin prepped with alcohol Band port accessed with Grewal needle  4.2 mL removed  0 mL total
no

## 2024-12-31 NOTE — REVIEW OF SYSTEMS
Call from [place]'s stat doc with bed number and nurse to nurse.  Patient will be going to [facility] observation room 109 bed 1  N-N [phone]  
[facility] call to state crew not available to take transfer at this time.  [place] aide to be contacted, pt and provider notified.  
Spoke with RADHA Johnson. Gave him report on pt. He has no questions at this time.  
[Negative] : Endocrine